# Patient Record
(demographics unavailable — no encounter records)

---

## 2025-01-09 NOTE — HISTORY OF PRESENT ILLNESS
[FreeTextEntry1] : CPAP almost 100% of the nights he is at home. Traveled for 1 week since last visit and did not take the machine with him. He travels roughly 1 week every 2 months. Feels improvement with CPAP use.  Initial History: Was diagnosed with moderate LIS about 1.5 years ago. Used an oral appliance for some time but it kept breaking and giving him TMJ pain so he stopped use. Has not tried CPAP. He has persistent snoring, and excessive sleepiness.  8/11/2020 Doing well. Using the APAP nightly and gets subjective benefit. Does have a few nights when he goes away and does not use the machine but this is rare. Gets supply replacements regularly.  2/16/2021 Doing well with the machine. Is more fatigued than before but is also under a lot of new stress.  9/1/2021 Using PAP nightly except when on vacation and is perceiving benefit.  3/4/2022 Doing well on PAP. Subjective benefit perceived.  12/13/2023 Doing well on PAP.  1/8/2025 struggling with pap mask  DME: [facility] Machine: Airsense 10, APAP 5 to 20 cm H2O Mask: nasal [ESS] : 4

## 2025-01-09 NOTE — CONSULT LETTER
[Dear  ___] : Dear  [unfilled], [Courtesy Letter:] : I had the pleasure of seeing your patient, [unfilled], in my office today. [Please see my note below.] : Please see my note below. [Consult Closing:] : Thank you very much for allowing me to participate in the care of this patient.  If you have any questions, please do not hesitate to contact me. [Sincerely,] : Sincerely, [FreeTextEntry3] : Chela Weber MD  [employer] at [facility] Pulmonary, Critical Care, and Sleep Medicine

## 2025-01-09 NOTE — ASSESSMENT
[FreeTextEntry1] : RE[facility] data until 1/2025  69yo with HLD and moderate LIS. Efficacy and adherence are at goal. He is struggling with the pap mask. He tried MAD in the past and could not tolerate it. Discussed cn12 stimulation and he is interested. Discussed pros cons of surgery including neuropraxia. Referred to Dr Colindres for a consultation. Follow up after DISE, if done.

## 2025-02-20 NOTE — PROCEDURE
[de-identified] : LARYNGOSCOPY EXAM:  Indication:  obstructive sleep apnea  -Verbal consent was obtained from patient prior to procedure. -Oxymetazoline 0.05% spray applied to the nasal cavities. Flexible laryngoscopy was performed via right nostril and revealed the following:   -- Nasopharynx had no mass or exudate.   -- Base of tongue was symmetric and not enlarged.   -- Vallecula was clear   -- Epiglottis, both aryepiglottic folds and both false vocal folds were normal  Epiglottic tip closer to posterior pharyngeal wall than usual.   -- Arytenoids both with mild edema and no erythema    -- True vocal folds were fully mobile and without lesions.    -- Post cricoid area was clear.   -- Interarytenoid edema was present.   -- No lesion seen in laryngopharynx   -- Unable to seal for modified James maneuver   The patient tolerated the procedure well.

## 2025-02-20 NOTE — PHYSICAL EXAM
[FreeTextEntry1] : No hoarseness.  [] : septum deviated bilaterally [Laryngoscopy Performed] : laryngoscopy was performed, see procedure section for findings [de-identified] : Mallampati 3 airway [de-identified] : 0-1+ bilateral  [Normal] : no rashes [de-identified] : Carotid pulses 2+ bilateral.

## 2025-02-20 NOTE — ASSESSMENT
[FreeTextEntry1] : Mr. PERKINS is a 71-year-old man who has moderate LIS and is not tolerating CPAP as well as he used to. Sleep study (9/2019) showed AHI 29, with 8.2 minutes below 89% O2 saturation. BMI is 23.63.  Tonsils are very small.  Mallampati 3 airway.  Small mandible relative to his face. He had tried a mandibular advancement device but could not tolerate it. We discussed Inspire hypoglossal nerve stimulator implant as an option for treatment.  I reviewed how the hypoglossal stimulator implant works, the surgery and postop process, along with risks, benefits and alternatives to the procedure.  Two incisions - one in the right upper neck and the second incision in the right upper chest - are required for access under general anesthesia.  Risks include, but are not limited to, bleeding, infection, nerve (hypoglossal, marginal mandibular) paresis or paralysis, neck swelling, pneumothorax, and scar. The stimulator is tested in the OR and then turned off.  Activation and titration of the stimulator would be done with  Dr. Weber about 1 month after surgery.  Settings are adjusted as needed to provide relief for the LIS while avoiding tongue and neck discomfort.  Battery change is expected to be needed about 10-12 years after initial use and would necessitate changing the pulse generator through the chest incision under general anesthesia in the OR. I told him that the current Inspire stimulator implant model is compatible with MRI scanners of 1.5Tesla or less. I also explained that a new model, the Inspire V, which does not require a respiratory probe, is expected to be available in the fall of this year.  First, drug-induced sleep endoscopy (DISE) is required to assess his candidacy for Inspire hypoglossal nerve stimulator implant.  The procedure, risks and benefits were discussed with the patient.  DISE is performed in the OR with an anesthesiologist present to provided medication to simulate sleep while maintaining his safety.  Risks include, but are not limited, to nosebleed, infection, difficulty breathing, and throat irritation.  The procedure is diagnostic, not therapeutic, and does not commit him to the CN12 stimulator implant.   His questions were answered, and he agreed to proceed. DISE is scheduled for March 2025 at [facility]. Medical evaluation and optimization by Dr. Giraldo would be greatly appreciated.

## 2025-04-07 NOTE — ASSESSMENT
[FreeTextEntry1] : Mr. PERKINS is a 71-year-old man who has moderate LIS and is not tolerating nasal CPAP as well as he used to. Sleep study (9/2019) showed AHI 29, with 8.2 minutes below 89% O2 saturation. BMI is 23.63.  Tonsils are very small.  Mallampati 3 airway.  Small mandible relative to his face. He had tried a mandibular advancement device but could not tolerate it. Drug-induced sleep endoscopy (DISE) showed 100% anterior-posterior collapse at velopharyngeal level during obstructive events, which if FAVORABLE for benefit from Inspire hypoglossal nerve stimulator implant as an option for treatment.   I reviewed how the hypoglossal stimulator implant works, the surgery and postop process, along with risks, benefits and alternatives to the procedure.  Two incisions - one in the right upper neck and the second incision in the right upper chest - are required for access under general anesthesia.  A cuff electrode is placed around the branches of the hypoglossal nerve that go to protruding tongue muscles; the electrode lead is tunneled under skin and platysma to exit the right upper chest incision where the pulse generator is placed in a pocket under the skin and fat.  In the current Inspire model 3028, a respiratory probe is placed between a thin muscle running between 2 ribs; pectoralis muscle fibers need to be  to get access to the ribs.  Inspire V model, expected to be available in 3rd quarter of this year, does not require a respiratory probe, so the surgery is shorter, has less risk and would result in less pain in the chest area.   Risks include, but are not limited to, bleeding, infection, nerve (hypoglossal, marginal mandibular) paresis or paralysis, neck swelling, pneumothorax (with respiratory probe), and scar. The stimulator is tested in the OR and then turned off.  Activation and titration of the stimulator would be done by Dr. Weber about 1 month after surgery.  Settings would be adjusted as needed to provide relief for the LIS while avoiding tongue and neck discomfort.  Battery change is expected to be needed about 10-12 years after initial use and would necessitate changing the pulse generator through the chest incision under general anesthesia in the OR. The current Inspire stimulator implant model is compatible with MRI scanners of 1.5Tesla or less.  The new Inspire V is expected to have same MRI compatibility. A home sleep study would be done with implant in use at about 3 months post surgery.  His questions were answered, and he would like to proceed with Inspire hypoglossal nerve stimulator implant surgery once the Inspire V is available.  I told him that he will be added to the list of patients waiting for Inspire V.  Dr. Weber will notify him when the Inspire V is available and will refer him to a sleep surgeon since I will be leaving [facility] at end of this month.

## 2025-04-07 NOTE — PHYSICAL EXAM
[FreeTextEntry1] : Small mandible relative to head. [Normal] : mucosa is normal [de-identified] : Mallampati 3 airway.  Normal tongue appearance.   [de-identified] : Narrow mandibular dental arch. [de-identified] : 0-1+ bilateral

## 2025-04-07 NOTE — CONSULT LETTER
[Dear  ___] : Dear  [unfilled], [Courtesy Letter:] : I had the pleasure of seeing your patient, [unfilled], in my office today. [Please see my note below.] : Please see my note below. [Consult Closing:] : Thank you very much for allowing me to participate in the care of this patient.  If you have any questions, please do not hesitate to contact me. [Sincerely,] : Sincerely, [FreeTextEntry2] : Chela Weber MD Pulmonary and Sleep Medicine  [FreeTextEntry3] :  Tammy Colindres MD  Otolaryngology, Head and Neck Surgery     [DrMiranda  ___] : Dr. MILES